# Patient Record
Sex: FEMALE | Race: WHITE | NOT HISPANIC OR LATINO | Employment: FULL TIME | URBAN - METROPOLITAN AREA
[De-identification: names, ages, dates, MRNs, and addresses within clinical notes are randomized per-mention and may not be internally consistent; named-entity substitution may affect disease eponyms.]

---

## 2022-07-28 ENCOUNTER — APPOINTMENT (OUTPATIENT)
Dept: GENERAL RADIOLOGY | Facility: CLINIC | Age: 42
End: 2022-07-28
Attending: EMERGENCY MEDICINE
Payer: COMMERCIAL

## 2022-07-28 ENCOUNTER — APPOINTMENT (OUTPATIENT)
Dept: CT IMAGING | Facility: CLINIC | Age: 42
End: 2022-07-28
Attending: EMERGENCY MEDICINE
Payer: COMMERCIAL

## 2022-07-28 ENCOUNTER — HOSPITAL ENCOUNTER (EMERGENCY)
Facility: CLINIC | Age: 42
Discharge: HOME | End: 2022-07-29
Attending: EMERGENCY MEDICINE | Admitting: EMERGENCY MEDICINE
Payer: COMMERCIAL

## 2022-07-28 VITALS
OXYGEN SATURATION: 97 % | TEMPERATURE: 98.7 F | RESPIRATION RATE: 13 BRPM | SYSTOLIC BLOOD PRESSURE: 121 MMHG | HEART RATE: 67 BPM | DIASTOLIC BLOOD PRESSURE: 70 MMHG

## 2022-07-28 DIAGNOSIS — R91.8 PULMONARY NODULES: ICD-10-CM

## 2022-07-28 DIAGNOSIS — R07.9 CHEST PAIN, UNSPECIFIED TYPE: ICD-10-CM

## 2022-07-28 DIAGNOSIS — R07.89 OTHER CHEST PAIN: ICD-10-CM

## 2022-07-28 LAB
ALBUMIN SERPL BCG-MCNC: 4.3 G/DL (ref 3.5–5.2)
ALP SERPL-CCNC: 86 U/L (ref 35–104)
ALT SERPL W P-5'-P-CCNC: ABNORMAL U/L
ANION GAP SERPL CALCULATED.3IONS-SCNC: 12 MMOL/L (ref 7–15)
AST SERPL W P-5'-P-CCNC: ABNORMAL U/L
BASOPHILS # BLD MANUAL: 0 10E3/UL (ref 0–0.2)
BASOPHILS NFR BLD MANUAL: 0 %
BILIRUB SERPL-MCNC: 0.2 MG/DL
BUN SERPL-MCNC: 11.9 MG/DL (ref 6–20)
CALCIUM SERPL-MCNC: 9.4 MG/DL (ref 8.6–10)
CHLORIDE SERPL-SCNC: 101 MMOL/L (ref 98–107)
CREAT BLD-MCNC: 0.7 MG/DL (ref 0.5–1)
CREAT SERPL-MCNC: 0.72 MG/DL (ref 0.51–0.95)
DEPRECATED HCO3 PLAS-SCNC: 22 MMOL/L (ref 22–29)
EOSINOPHIL # BLD MANUAL: 0 10E3/UL (ref 0–0.7)
EOSINOPHIL NFR BLD MANUAL: 0 %
ERYTHROCYTE [DISTWIDTH] IN BLOOD BY AUTOMATED COUNT: 12.7 % (ref 10–15)
GFR SERPL CREATININE-BSD FRML MDRD: >60 ML/MIN/1.73M2
GFR SERPL CREATININE-BSD FRML MDRD: >90 ML/MIN/1.73M2
GLUCOSE SERPL-MCNC: 125 MG/DL (ref 70–99)
HCT VFR BLD AUTO: 43.7 % (ref 35–47)
HGB BLD-MCNC: 14.2 G/DL (ref 11.7–15.7)
HOLD SPECIMEN: NORMAL
LIPASE SERPL-CCNC: 41 U/L (ref 13–60)
LYMPHOCYTES # BLD MANUAL: 5.5 10E3/UL (ref 0.8–5.3)
LYMPHOCYTES NFR BLD MANUAL: 32 %
MCH RBC QN AUTO: 30.5 PG (ref 26.5–33)
MCHC RBC AUTO-ENTMCNC: 32.5 G/DL (ref 31.5–36.5)
MCV RBC AUTO: 94 FL (ref 78–100)
MONOCYTES # BLD MANUAL: 0.5 10E3/UL (ref 0–1.3)
MONOCYTES NFR BLD MANUAL: 3 %
NEUTROPHILS # BLD MANUAL: 11.2 10E3/UL (ref 1.6–8.3)
NEUTROPHILS NFR BLD MANUAL: 65 %
PLAT MORPH BLD: ABNORMAL
PLATELET # BLD AUTO: 453 10E3/UL (ref 150–450)
POTASSIUM SERPL-SCNC: 4.5 MMOL/L (ref 3.4–5.3)
PROT SERPL-MCNC: 7.5 G/DL (ref 6.4–8.3)
RBC # BLD AUTO: 4.65 10E6/UL (ref 3.8–5.2)
RBC MORPH BLD: ABNORMAL
SODIUM SERPL-SCNC: 135 MMOL/L (ref 136–145)
TROPONIN T SERPL HS-MCNC: <6 NG/L
WBC # BLD AUTO: 17.2 10E3/UL (ref 4–11)

## 2022-07-28 PROCEDURE — 83690 ASSAY OF LIPASE: CPT | Performed by: EMERGENCY MEDICINE

## 2022-07-28 PROCEDURE — 250N000013 HC RX MED GY IP 250 OP 250 PS 637: Performed by: EMERGENCY MEDICINE

## 2022-07-28 PROCEDURE — 74174 CTA ABD&PLVS W/CONTRAST: CPT

## 2022-07-28 PROCEDURE — 36415 COLL VENOUS BLD VENIPUNCTURE: CPT | Performed by: EMERGENCY MEDICINE

## 2022-07-28 PROCEDURE — 93010 ELECTROCARDIOGRAM REPORT: CPT | Performed by: EMERGENCY MEDICINE

## 2022-07-28 PROCEDURE — 99285 EMERGENCY DEPT VISIT HI MDM: CPT | Mod: 25

## 2022-07-28 PROCEDURE — 250N000009 HC RX 250: Performed by: EMERGENCY MEDICINE

## 2022-07-28 PROCEDURE — 84484 ASSAY OF TROPONIN QUANT: CPT | Mod: 91 | Performed by: EMERGENCY MEDICINE

## 2022-07-28 PROCEDURE — 71045 X-RAY EXAM CHEST 1 VIEW: CPT | Mod: 26 | Performed by: STUDENT IN AN ORGANIZED HEALTH CARE EDUCATION/TRAINING PROGRAM

## 2022-07-28 PROCEDURE — 74174 CTA ABD&PLVS W/CONTRAST: CPT | Mod: 26 | Performed by: STUDENT IN AN ORGANIZED HEALTH CARE EDUCATION/TRAINING PROGRAM

## 2022-07-28 PROCEDURE — 250N000011 HC RX IP 250 OP 636: Performed by: EMERGENCY MEDICINE

## 2022-07-28 PROCEDURE — 71275 CT ANGIOGRAPHY CHEST: CPT | Mod: 26 | Performed by: STUDENT IN AN ORGANIZED HEALTH CARE EDUCATION/TRAINING PROGRAM

## 2022-07-28 PROCEDURE — 85007 BL SMEAR W/DIFF WBC COUNT: CPT | Performed by: EMERGENCY MEDICINE

## 2022-07-28 PROCEDURE — 84484 ASSAY OF TROPONIN QUANT: CPT | Performed by: EMERGENCY MEDICINE

## 2022-07-28 PROCEDURE — 96374 THER/PROPH/DIAG INJ IV PUSH: CPT | Mod: 59

## 2022-07-28 PROCEDURE — 93005 ELECTROCARDIOGRAM TRACING: CPT

## 2022-07-28 PROCEDURE — 82565 ASSAY OF CREATININE: CPT

## 2022-07-28 PROCEDURE — 71275 CT ANGIOGRAPHY CHEST: CPT

## 2022-07-28 PROCEDURE — 99285 EMERGENCY DEPT VISIT HI MDM: CPT | Mod: 25 | Performed by: EMERGENCY MEDICINE

## 2022-07-28 PROCEDURE — 84155 ASSAY OF PROTEIN SERUM: CPT | Performed by: EMERGENCY MEDICINE

## 2022-07-28 PROCEDURE — 85027 COMPLETE CBC AUTOMATED: CPT | Performed by: EMERGENCY MEDICINE

## 2022-07-28 PROCEDURE — 71045 X-RAY EXAM CHEST 1 VIEW: CPT

## 2022-07-28 RX ORDER — IOPAMIDOL 755 MG/ML
100 INJECTION, SOLUTION INTRAVASCULAR ONCE
Status: COMPLETED | OUTPATIENT
Start: 2022-07-28 | End: 2022-07-28

## 2022-07-28 RX ADMIN — LIDOCAINE HYDROCHLORIDE 30 ML: 20 SOLUTION ORAL; TOPICAL at 22:41

## 2022-07-28 RX ADMIN — IOPAMIDOL 100 ML: 755 INJECTION, SOLUTION INTRAVENOUS at 20:53

## 2022-07-28 ASSESSMENT — ENCOUNTER SYMPTOMS
CHEST TIGHTNESS: 0
DIFFICULTY URINATING: 0
ARTHRALGIAS: 0
COUGH: 1
CHILLS: 0
SORE THROAT: 0
BACK PAIN: 0
PALPITATIONS: 0
WEAKNESS: 0
SHORTNESS OF BREATH: 1
ABDOMINAL DISTENTION: 0
ABDOMINAL PAIN: 0
CONFUSION: 0
HEADACHES: 0
CONSTIPATION: 0
EYE PAIN: 0
VOMITING: 0
DYSURIA: 0
COLOR CHANGE: 0
FATIGUE: 0
MYALGIAS: 0
DIZZINESS: 0
NECK PAIN: 0
DIARRHEA: 0
NAUSEA: 0
FEVER: 0
FREQUENCY: 0

## 2022-07-29 LAB
ATRIAL RATE - MUSE: 66 BPM
DIASTOLIC BLOOD PRESSURE - MUSE: NORMAL MMHG
INTERPRETATION ECG - MUSE: NORMAL
P AXIS - MUSE: 57 DEGREES
PR INTERVAL - MUSE: 128 MS
QRS DURATION - MUSE: 80 MS
QT - MUSE: 438 MS
QTC - MUSE: 455 MS
R AXIS - MUSE: 75 DEGREES
SYSTOLIC BLOOD PRESSURE - MUSE: NORMAL MMHG
T AXIS - MUSE: 58 DEGREES
TROPONIN T SERPL HS-MCNC: <6 NG/L
VENTRICULAR RATE- MUSE: 65 BPM

## 2022-07-29 PROCEDURE — C9113 INJ PANTOPRAZOLE SODIUM, VIA: HCPCS | Performed by: EMERGENCY MEDICINE

## 2022-07-29 PROCEDURE — 250N000013 HC RX MED GY IP 250 OP 250 PS 637: Performed by: EMERGENCY MEDICINE

## 2022-07-29 PROCEDURE — 250N000011 HC RX IP 250 OP 636: Performed by: EMERGENCY MEDICINE

## 2022-07-29 PROCEDURE — 96374 THER/PROPH/DIAG INJ IV PUSH: CPT | Mod: 59

## 2022-07-29 RX ORDER — DOXYCYCLINE 100 MG/1
100 CAPSULE ORAL ONCE
Status: COMPLETED | OUTPATIENT
Start: 2022-07-29 | End: 2022-07-29

## 2022-07-29 RX ORDER — SUCRALFATE ORAL 1 G/10ML
1 SUSPENSION ORAL 4 TIMES DAILY
Qty: 414 ML | Refills: 0 | Status: SHIPPED | OUTPATIENT
Start: 2022-07-29

## 2022-07-29 RX ORDER — DOXYCYCLINE 100 MG/1
100 CAPSULE ORAL 2 TIMES DAILY
Qty: 14 CAPSULE | Refills: 0 | Status: SHIPPED | OUTPATIENT
Start: 2022-07-29 | End: 2022-08-05

## 2022-07-29 RX ADMIN — PANTOPRAZOLE SODIUM 40 MG: 40 INJECTION, POWDER, FOR SOLUTION INTRAVENOUS at 00:46

## 2022-07-29 RX ADMIN — DOXYCYCLINE HYCLATE 100 MG: 100 CAPSULE ORAL at 00:46

## 2022-07-29 NOTE — ED PROVIDER NOTES
"    [facility] EMERGENCY DEPARTMENT ([facility])  7/28/22  History     Chief Complaint   Patient presents with     Shortness of Breath     Chest Pain     The history is provided by the patient and the spouse.     Marlin Fry is a 41 year old female with no known significant past medical history who presents to the ED with her  for evaluation of chest pain and shortness of breath.  Patient finished dinner at approximately 8 PM this evening, 30 minutes prior to arrival, when she suddenly developed discomfort in her chest.  She described the initial discomfort as feeling like heartburn but subsequently developed shortness of breath.  She described this as feeling like her \"lung collapsed.\"  She has since noted difficulty with deep inhalations.  She denies experiencing anything similar in the past.  Currently, the patient endorses sharp pain in her sternal and left chest which radiates into her back.  She notes her chest pain and shortness of breath seem to improve while laying in a supine position.  The patient reports feeling congested and experiencing a cough for the past several days.  She denies recent fever or chills.  Patient denies a personal history of heart or lung disease.  No previous PE/DVTs.  She notes chronic swelling in one of her lower extremities but states this has not worsened nor is either leg painful. The patient did have a drink of whiskey tonight with her dinner. She denies the use of tobacco. Patient is prescribed topiramate for her joint discomfort.  Of note, patient's sister had an aortic dissection at 38 years old.  The patient's sister was later found to have fibromuscular dysplasia.  The patient was tested negative for fibromuscular dysplasia.      I have reviewed the Medications, Allergies, Past Medical and Surgical History, and Social History in the [facility] system.  PAST MEDICAL HISTORY: No past medical history on file.    PAST SURGICAL HISTORY: No past surgical history on " file.    Past medical history, past surgical history, medications, and allergies were reviewed with the patient. Additional pertinent items: None    FAMILY HISTORY: No family history on file.    SOCIAL HISTORY:   Social History     Tobacco Use     Smoking status: Not on file     Smokeless tobacco: Not on file   Substance Use Topics     Alcohol use: Not on file     Social history was reviewed with the patient. Additional pertinent items: None      Patient's Medications   New Prescriptions    DOXYCYCLINE HYCLATE (VIBRAMYCIN) 100 MG CAPSULE    Take 1 capsule (100 mg) by mouth 2 times daily for 7 days    OMEPRAZOLE (PRILOSEC) 20 MG DR CAPSULE    Take 1 capsule (20 mg) by mouth daily    SUCRALFATE (CARAFATE) 1 GM/10ML SUSPENSION    Take 10 mLs (1 g) by mouth 4 times daily   Previous Medications    No medications on file   Modified Medications    No medications on file   Discontinued Medications    No medications on file          Allergies   Allergen Reactions     Penicillins         Review of Systems   Constitutional: Negative for chills, fatigue and fever.   HENT: Positive for congestion. Negative for sore throat.    Eyes: Negative for pain and visual disturbance.   Respiratory: Positive for cough and shortness of breath. Negative for chest tightness.    Cardiovascular: Positive for chest pain. Negative for palpitations and leg swelling.   Gastrointestinal: Negative for abdominal distention, abdominal pain, constipation, diarrhea, nausea and vomiting.   Genitourinary: Negative for difficulty urinating, dysuria, frequency and urgency.   Musculoskeletal: Negative for arthralgias, back pain, myalgias and neck pain.   Skin: Negative for color change and rash.   Neurological: Negative for dizziness, weakness and headaches.   Psychiatric/Behavioral: Negative for confusion.     A complete review of systems was performed with pertinent positives and negatives noted in the HPI, and all other systems negative.    Physical Exam    BP: (!) 136/102  Pulse: 77  Temp: 98.7  F (37.1  C)  Resp: 26  SpO2: 100 %      Physical Exam  Vitals and nursing note reviewed.   Constitutional:       General: She is not in acute distress.     Appearance: Normal appearance. She is not ill-appearing or toxic-appearing.   HENT:      Head: Normocephalic and atraumatic.      Nose: Nose normal.      Mouth/Throat:      Mouth: Mucous membranes are moist.   Eyes:      Pupils: Pupils are equal, round, and reactive to light.   Cardiovascular:      Rate and Rhythm: Normal rate.      Pulses: Normal pulses.      Heart sounds: Normal heart sounds.   Pulmonary:      Effort: Pulmonary effort is normal. No respiratory distress.      Breath sounds: Normal breath sounds.   Abdominal:      General: Abdomen is flat. There is no distension.   Musculoskeletal:         General: No swelling or deformity. Normal range of motion.      Cervical back: Normal range of motion. No rigidity.   Skin:     General: Skin is warm.      Capillary Refill: Capillary refill takes less than 2 seconds.   Neurological:      Mental Status: She is alert and oriented to person, place, and time.   Psychiatric:         Mood and Affect: Mood normal.         ED Course     ED Course as of 07/29/22 0102   Thu Jul 28, 2022 2034 Patient presents to the ED for evaluation of cute onset severe chest pain.  Pain is located in the substernal area and radiating to her back.  Family history of aortic dissection.   2034 Differential diagnosis includes ACS, PE, aortic dissection, GERD, pancreatitis, cholecystitis, pneumothorax, tamponade, costochondritis   2034 On arrival, patient is in severe distress, clutching her chest.  Labored breathing.  Saturating 100% on room air.  No fever   2034 Heart rate 77.   2034 Cardiac work-up ordered.  Plan for stat portable chest x-ray and stat PE study.   2224      EKG Interpretation:     Interpreted by Josue Davila DO  Time reviewed:2032   Symptoms at time of EKG: chest pain    Rhythm: normal sinus w sinus arrhythmia  Rate: normal  Axis: NORMAL  Ectopy: none  Conduction: normal  ST Segments/ T Waves: No ST-T wave changes  Q Waves: none  Comparison to prior: No old EKG available    Clinical Impression: normal EKG          Results for orders placed or performed during the hospital encounter of 07/28/22 (from the past 24 hour(s))   [place] Draw    Narrative    The following orders were created for panel order [place] Draw.  Procedure                               Abnormality         Status                     ---------                               -----------         ------                     Extra Blue Top Tube[[id number]]                              Final result               Extra Red Top Tube[[id number]]                               Final result               Extra Green Top (Lithium...[[id number]]                      Final result               Extra Purple Top Tube[[id number]]                            Final result                 Please view results for these tests on the individual orders.   Extra Blue Top Tube   Result Value Ref Range    Hold Specimen JIC    Extra Red Top Tube   Result Value Ref Range    Hold Specimen JIC    Extra Green Top (Lithium Heparin) Tube   Result Value Ref Range    Hold Specimen JIC    Extra Purple Top Tube   Result Value Ref Range    Hold Specimen JIC    CBC with platelets differential    Narrative    The following orders were created for panel order CBC with platelets differential.  Procedure                               Abnormality         Status                     ---------                               -----------         ------                     CBC with platelets and d...[[id number]]  Abnormal            Final result               Manual Differential[[id number]]          Abnormal            Final result                 Please view results for these tests on the individual orders.   Comprehensive metabolic panel   Result Value Ref Range    Sodium  135 (L) 136 - 145 mmol/L    Potassium 4.5 3.4 - 5.3 mmol/L    Creatinine 0.72 0.51 - 0.95 mg/dL    Urea Nitrogen 11.9 6.0 - 20.0 mg/dL    Chloride 101 98 - 107 mmol/L    Carbon Dioxide (CO2) 22 22 - 29 mmol/L    Anion Gap 12 7 - 15 mmol/L    Glucose 125 (H) 70 - 99 mg/dL    Calcium 9.4 8.6 - 10.0 mg/dL    Protein Total 7.5 6.4 - 8.3 g/dL    Albumin 4.3 3.5 - 5.2 g/dL    Bilirubin Total 0.2 <=1.2 mg/dL    Alkaline Phosphatase 86 35 - 104 U/L    AST      ALT      GFR Estimate >90 >60 mL/min/1.73m2   Lipase   Result Value Ref Range    Lipase 41 13 - 60 U/L   Troponin T, High Sensitivity   Result Value Ref Range    Troponin T, High Sensitivity <6 <=14 ng/L   CBC with platelets and differential   Result Value Ref Range    WBC Count 17.2 (H) 4.0 - 11.0 10e3/uL    RBC Count 4.65 3.80 - 5.20 10e6/uL    Hemoglobin 14.2 11.7 - 15.7 g/dL    Hematocrit 43.7 35.0 - 47.0 %    MCV 94 78 - 100 fL    MCH 30.5 26.5 - 33.0 pg    MCHC 32.5 31.5 - 36.5 g/dL    RDW 12.7 10.0 - 15.0 %    Platelet Count 453 (H) 150 - 450 10e3/uL   Manual Differential   Result Value Ref Range    % Neutrophils 65 %    % Lymphocytes 32 %    % Monocytes 3 %    % Eosinophils 0 %    % Basophils 0 %    Absolute Neutrophils 11.2 (H) 1.6 - 8.3 10e3/uL    Absolute Lymphocytes 5.5 (H) 0.8 - 5.3 10e3/uL    Absolute Monocytes 0.5 0.0 - 1.3 10e3/uL    Absolute Eosinophils 0.0 0.0 - 0.7 10e3/uL    Absolute Basophils 0.0 0.0 - 0.2 10e3/uL    RBC Morphology Confirmed RBC Indices     Platelet Assessment  Automated Count Confirmed. Platelet morphology is normal.     Automated Count Confirmed. Platelet morphology is normal.   Creatinine POCT   Result Value Ref Range    Creatinine POCT 0.7 0.5 - 1.0 mg/dL    GFR, ESTIMATED POCT >60 >60 mL/min/1.73m2   XR Chest Port 1 View    Narrative    EXAM: XR Chest 1 view 7/28/2022 8:38 PM      HISTORY: chest pain.    COMPARISON: None.     TECHNIQUE: Frontal view of the chest.    FINDINGS: Trachea is midline. Cardiomediastinal silhouettes  within  normal limits. Mild interstitial pulmonary opacities. No pleural  effusions. No pneumothoraces.      Impression    IMPRESSION: Mild interstitial pulmonary opacities, which may represent  pulmonary edema, atelectasis, or infection.    I have personally reviewed the examination and initial interpretation  and I agree with the findings.    SG DAVIS MD         SYSTEM ID:  [id number]   CTA Chest Abdomen Pelvis w Contrast    Impression    RESIDENT PRELIMINARY INTERPRETATION  IMPRESSION:   1. No pulmonary embolus.  2. No aortic dissection.  3. Scattered sub-6 mm pulmonary nodules, which may represent  infectious, inflammatory, or neoplastic etiology. Optional follow-up  CT in 12 months, if patient is high risk for intrathoracic malignancy.  4. Cholelithiasis without evidence of cholecystitis.   Troponin T, High Sensitivity   Result Value Ref Range    Troponin T, High Sensitivity <6 <=14 ng/L     Medications   iopamidol (ISOVUE-370) solution 100 mL (100 mLs Intravenous Given 7/28/22 2053)   sodium chloride (PF) 0.9% PF flush 90 mL (90 mLs Intravenous Given 7/28/22 2054)   lidocaine (viscous) (XYLOCAINE) 2 % 15 mL, alum & mag hydroxide-simethicone (MAALOX) 15 mL GI Cocktail (30 mLs Oral Given 7/28/22 2241)   pantoprazole (PROTONIX) IV push injection 40 mg (40 mg Intravenous Given 7/29/22 0046)   doxycycline hyclate (VIBRAMYCIN) capsule 100 mg (100 mg Oral Given 7/29/22 0046)             Assessments & Plan (with Medical Decision Making)   EKG is nonischemic.  Troponin is negative x2.  Unlikely ACS.  CT scan is negative for pulmonary embolism pneumothorax, pericardial effusion, or aortic dissection    No bilirubin elevation or transaminitis suggest hepatobiliary pathology.  No lipase elevation to suggest acute pancreatitis.    Patient's pain significantly improved after GI cocktail.  Strong suspicion for gastritis/peptic ulcer disease.  States she does have history of known ulcers.  Not currently on a PPI.  Given  Protonix in the ED and started on daily omeprazole.  Also scribed Carafate.  Will need PCP follow-up and likely outpatient endoscopy.    Additionally, CT scan and x-ray show pulmonary nodules suspicious for infectious, inflammatory, or malignant etiology.  Patient does state that she has had a cough for the past 2 days and has a white blood cell count of 17.  We will prescribe antibiotics to cover for potential community-acquired pneumonia.  This finding was discussed with the patient.  She knows to follow-up with her primary care physician and will likely need repeat imaging to evaluate for resolution.    I have reviewed the nursing notes.    I have reviewed the findings, diagnosis, plan and need for follow up with the patient.    New Prescriptions    DOXYCYCLINE HYCLATE (VIBRAMYCIN) 100 MG CAPSULE    Take 1 capsule (100 mg) by mouth 2 times daily for 7 days    OMEPRAZOLE (PRILOSEC) 20 MG DR CAPSULE    Take 1 capsule (20 mg) by mouth daily    SUCRALFATE (CARAFATE) 1 GM/10ML SUSPENSION    Take 10 mLs (1 g) by mouth 4 times daily       Final diagnoses:   Chest pain, unspecified type   Pulmonary nodules     I, Petey Sainz, am serving as a trained medical scribe to document services personally performed by Josue Davila DO, based on the provider's statements to me.      I, Josue Davila DO, was physically present and have reviewed and verified the accuracy of this note documented by Petey Sainz.     7/28/2022   [facility] EMERGENCY DEPARTMENT     Josue Davila DO  07/29/22 0104

## 2022-07-29 NOTE — ED TRIAGE NOTES
Pt arrives to triage with complaints of shortness of breath and chest pain that started about 30 min prior.

## 2022-07-29 NOTE — DISCHARGE INSTRUCTIONS
Your work-up in the emergency department including CT scan, EKG, chest x-ray, did not reveal any significant medical problems.  CT scan was negative for aortic dissection or pulmonary embolism.  Labs and EKG did not suggest heart attack.  We did have concern that he has significant irritation of the gastric lining or possible peptic ulcer disease from excessive acid production..  I started you on acid reducing medication.  It is very important to follow-up with your primary care physician to reassess your symptoms and to discuss further care of this.  You will likely need an endoscopy.    Additionally, you had some nodules on your lung CT.  These could potentially be infectious in nature, especially given your cough for the past 2 days.  Have started you on oral antibiotics.  Is also important to discuss this finding with your primary care physician as you will likely need repeat imaging to reassess the pulmonary nodules at a later date.

## 2022-09-25 ENCOUNTER — HEALTH MAINTENANCE LETTER (OUTPATIENT)
Age: 42
End: 2022-09-25

## 2023-10-14 ENCOUNTER — HEALTH MAINTENANCE LETTER (OUTPATIENT)
Age: 43
End: 2023-10-14

## 2024-03-02 ENCOUNTER — HEALTH MAINTENANCE LETTER (OUTPATIENT)
Age: 44
End: 2024-03-02

## 2024-12-07 ENCOUNTER — HEALTH MAINTENANCE LETTER (OUTPATIENT)
Age: 44
End: 2024-12-07

## (undated) RX ORDER — HEPARIN SODIUM 1000 [USP'U]/ML
INJECTION, SOLUTION INTRAVENOUS; SUBCUTANEOUS
Status: DISPENSED
Start: 2022-08-02

## (undated) RX ORDER — PAPAVERINE HYDROCHLORIDE 30 MG/ML
INJECTION INTRAMUSCULAR; INTRAVENOUS
Status: DISPENSED
Start: 2022-08-02

## (undated) RX ORDER — CEFAZOLIN SODIUM 1 G/3ML
INJECTION, POWDER, FOR SOLUTION INTRAMUSCULAR; INTRAVENOUS
Status: DISPENSED
Start: 2022-08-02